# Patient Record
Sex: MALE | Race: WHITE | Employment: UNEMPLOYED | URBAN - NONMETROPOLITAN AREA
[De-identification: names, ages, dates, MRNs, and addresses within clinical notes are randomized per-mention and may not be internally consistent; named-entity substitution may affect disease eponyms.]

---

## 2017-07-06 ENCOUNTER — AMBULATORY - GICH (OUTPATIENT)
Dept: RADIOLOGY | Facility: OTHER | Age: 47
End: 2017-07-06

## 2017-07-06 DIAGNOSIS — M48.061 SPINAL STENOSIS OF LUMBAR REGION: ICD-10-CM

## 2018-01-18 RX ORDER — IBUPROFEN 400 MG/1
TABLET, FILM COATED ORAL
COMMUNITY
Start: 2013-01-11

## 2020-05-28 ENCOUNTER — HOSPITAL ENCOUNTER (EMERGENCY)
Facility: HOSPITAL | Age: 50
Discharge: HOME | End: 2020-05-28
Attending: NURSE PRACTITIONER | Admitting: NURSE PRACTITIONER
Payer: COMMERCIAL

## 2020-05-28 VITALS
HEART RATE: 100 BPM | RESPIRATION RATE: 16 BRPM | OXYGEN SATURATION: 97 % | TEMPERATURE: 97.4 F | DIASTOLIC BLOOD PRESSURE: 84 MMHG | SYSTOLIC BLOOD PRESSURE: 135 MMHG

## 2020-05-28 DIAGNOSIS — H66.90 ACUTE OTITIS MEDIA: ICD-10-CM

## 2020-05-28 PROCEDURE — G0463 HOSPITAL OUTPT CLINIC VISIT: HCPCS | Mod: 25

## 2020-05-28 PROCEDURE — 25000128 H RX IP 250 OP 636: Performed by: NURSE PRACTITIONER

## 2020-05-28 PROCEDURE — 96372 THER/PROPH/DIAG INJ SC/IM: CPT

## 2020-05-28 PROCEDURE — 99203 OFFICE O/P NEW LOW 30 MIN: CPT | Mod: Z6 | Performed by: NURSE PRACTITIONER

## 2020-05-28 RX ORDER — KETOROLAC TROMETHAMINE 30 MG/ML
60 INJECTION, SOLUTION INTRAMUSCULAR; INTRAVENOUS ONCE
Status: COMPLETED | OUTPATIENT
Start: 2020-05-28 | End: 2020-05-28

## 2020-05-28 RX ADMIN — KETOROLAC TROMETHAMINE 60 MG: 30 INJECTION, SOLUTION INTRAMUSCULAR; INTRAVENOUS at 15:09

## 2020-05-28 ASSESSMENT — ENCOUNTER SYMPTOMS
NAUSEA: 0
FACIAL SWELLING: 0
NEUROLOGICAL NEGATIVE: 1
SINUS PRESSURE: 1
RHINORRHEA: 0
NECK STIFFNESS: 0
ACTIVITY CHANGE: 1
FEVER: 0
CHILLS: 0
SINUS PAIN: 1
NECK PAIN: 0
VOMITING: 0

## 2020-05-28 NOTE — ED PROVIDER NOTES
"  History     Chief Complaint   Patient presents with     Dental Pain     HPI  Eliu Renae is a 50 year old male who complains of right upper molar dental pain. His blood sugar has been off and he knew, \"something was wrong.\" this is accompanied with right ear pain, and sinus pain and pressure. Last dentist appointment was in February 2020. He took Tylenol yesterday with minimal relief. Type II diabetic. Denies fevers, chills, nausea, and vomiting.    Allergies:  Allergies   Allergen Reactions     Dextromethorphan      Other reaction(s): Sleep Disturbances       Problem List:    There are no active problems to display for this patient.       Past Medical History:    History reviewed. No pertinent past medical history.    Past Surgical History:    History reviewed. No pertinent surgical history.    Family History:    No family history on file.    Social History:  Marital Status:   [2]  Social History     Tobacco Use     Smoking status: None   Substance Use Topics     Alcohol use: None     Drug use: None        Medications:    amoxicillin-clavulanate (AUGMENTIN) 875-125 MG tablet  ibuprofen (ADVIL/MOTRIN) 400 MG tablet          Review of Systems   Constitutional: Positive for activity change. Negative for chills and fever.   HENT: Positive for dental problem, ear pain (right), sinus pressure and sinus pain. Negative for facial swelling and rhinorrhea.         Chewing is (ainful    Gastrointestinal: Negative for nausea and vomiting.   Musculoskeletal: Negative for neck pain and neck stiffness.   Neurological: Negative.        Physical Exam   BP: 135/84  Pulse: 100  Temp: 97.4  F (36.3  C)  Resp: 16  SpO2: 97 %      Physical Exam  Vitals signs and nursing note reviewed.   Constitutional:       General: He is in acute distress.   HENT:      Head: Normocephalic.      Jaw: There is normal jaw occlusion.        Comments: Has mild right sided facial swelling     Right Ear: Ear canal normal. Tympanic membrane is " "erythematous and bulging.      Left Ear: Tympanic membrane and ear canal normal.      Nose: Nose normal.      Right Sinus: No maxillary sinus tenderness or frontal sinus tenderness.      Left Sinus: No maxillary sinus tenderness or frontal sinus tenderness.      Mouth/Throat:      Lips: Pink.      Mouth: Mucous membranes are moist.      Pharynx: Uvula midline. Posterior oropharyngeal erythema present.   Eyes:      Conjunctiva/sclera: Conjunctivae normal.   Cardiovascular:      Rate and Rhythm: Normal rate and regular rhythm.      Heart sounds: Normal heart sounds. No murmur.   Pulmonary:      Effort: Pulmonary effort is normal. No respiratory distress.      Breath sounds: Normal breath sounds. No wheezing.   Lymphadenopathy:      Cervical: Cervical adenopathy present.      Right cervical: Superficial cervical adenopathy present.      Left cervical: Superficial cervical adenopathy present.   Skin:     General: Skin is warm and dry.   Neurological:      Mental Status: He is alert and oriented to person, place, and time.   Psychiatric:         Behavior: Behavior normal.         ED Course        Procedures             No results found for this or any previous visit (from the past 24 hour(s)).    Medications   ketorolac (TORADOL) injection 60 mg (60 mg Intramuscular Given 5/28/20 9712)       Assessments & Plan (with Medical Decision Making)     I have reviewed the nursing notes.    I have reviewed the findings, diagnosis, plan and need for follow up with the patient.  (H66.90) Acute otitis media - right ear  Comment:50 year old male who complains of right upper molar dental pain. His blood sugar has been off and he knew, \"something was wrong.\" this is accompanied with right ear pain, and sinus pain and pressure. Last dentist appointment was in February 2020. He took Tylenol yesterday with minimal relief. Type II diabetic. Denies fevers, chills, nausea, and vomiting.    Assessment:  mild right sided facial swelling. " "Posterior oropharynx erythema. Bilateral anterior cervical adenopathy. Right tympanic membrane bulging and erythematous.      Plan: Augmentin bid for ten days. Education provided and discussed for this/these medication and for otitis media.  Increase fluids. Complete all antibiotics even if feeling better.  Taking antibiotics with food may decrease the symptoms, of an upset stomach, that can occur when taking antibiotics. Antibiotics frequently cause diarrhea. Probiotics or yogurt may help prevent or decrease these symptoms. Return to be reevaluated if symptoms do not improve, or worsen.  I think your dental pain is related to your ear infection. Use below interventions to help decrease your dental discomfort. Tylenol 650 to 100 mg every four to six hours as needed for pain. Do not take more than 4000 mg of Tylenol (acetaminophen) in 24 hours period.  Apply a cold pack or ice compress for up to 20 minutes several times a day. This is to help reduce pain and relieve swelling. Cover it with a thin, dry cloth before putting it on your skin.    Rinse your mouth with warm saltwater several times per day. This will help reduce irritation, gum swelling, and pain.  Good oral hygiene is imperitive. Brush your at least twice a day. Use a fluoride toothpaste and soft-bristle toothbrush.  Eat a healthy diet that doesn t include many sugary foods and drinks.  Call ASAP to schedule an appointment to see a dentist.   Our  department can try to assist you if you are having difficulty finding a dentist, dental coverage, or paying for dental care.  You can reach them by calling [phone] and asking for .  Return to ED/UC if symptoms increase or concerns develop such as those discussed and listed on the \"When to go the Emergency Room\" portion of your discharge instructions.   These discharge instructions and medications were reviewed with him and understanding verbalized.    Discharge Medication List as of " 5/28/2020  3:00 PM          Final diagnoses:   Acute otitis media - right ear       5/28/2020   [facility]       Bettina Pritchard, CNP  05/30/20 1826

## 2020-05-28 NOTE — ED TRIAGE NOTES
"Pt is here with c/o right upper back dental pain \" I have an abscess\". Ongoing 2 days ago, but pain want present until yesterday. Pt notes he has not taking any otc medication today and he doesn't take ibu because it hurts his stomach.   "

## 2020-05-28 NOTE — ED AVS SNAPSHOT
[facility]  [address]  [place] [address]  Phone:  [phone]                                    Eliu Renae   MRN: [id number]    Department:  [facility]   Date of Visit:  5/28/2020           After Visit Summary Signature Page    I have received my discharge instructions, and my questions have been answered. I have discussed any challenges I see with this plan with the nurse or doctor.    ..........................................................................................................................................  Patient/Patient Representative Signature      ..........................................................................................................................................  Patient Representative Print Name and Relationship to Patient    ..................................................               ................................................  Date                                   Time    ..........................................................................................................................................  Reviewed by Signature/Title    ...................................................              ..............................................  Date                                               Time          22EPIC Rev 08/18

## 2020-05-28 NOTE — DISCHARGE INSTRUCTIONS
"Increase fluids. Complete all antibiotics even if feeling better.  Taking antibiotics with food may decrease the symptoms, of an upset stomach, that can occur when taking antibiotics. Antibiotics frequently cause diarrhea. Probiotics or yogurt may help prevent or decrease these symptoms. Return to be reevaluated if symptoms do not improve, or worsen.  I think your dental pain is related to your ear infection. Use below interventions to help decrease your dental discomfort. Tylenol 650 to 100 mg every four to six hours as needed for pain. Do not take more than 4000 mg of Tylenol (acetaminophen) in 24 hours period.  Apply a cold pack or ice compress for up to 20 minutes several times a day. This is to help reduce pain and relieve swelling. Cover it with a thin, dry cloth before putting it on your skin.    Rinse your mouth with warm saltwater several times per day. This will help reduce irritation, gum swelling, and pain.  Good oral hygiene is imperitive. Brush your at least twice a day. Use a fluoride toothpaste and soft-bristle toothbrush.  Eat a healthy diet that doesn t include many sugary foods and drinks.  Call ASAP to schedule an appointment to see a dentist.   Our  department can try to assist you if you are having difficulty finding a dentist, dental coverage, or paying for dental care.  You can reach them by calling [phone] and asking for .  Return to ED/UC if symptoms increase or concerns develop such as those discussed and listed on the \"When to go the Emergency Room\" portion of your discharge instructions.       "

## 2020-05-28 NOTE — ED NOTES
After toradol inj pt stated he felt comfortable leaving because he has never had a reaction from it before